# Patient Record
Sex: MALE | Race: WHITE | Employment: FULL TIME | URBAN - METROPOLITAN AREA
[De-identification: names, ages, dates, MRNs, and addresses within clinical notes are randomized per-mention and may not be internally consistent; named-entity substitution may affect disease eponyms.]

---

## 2020-09-03 ENCOUNTER — APPOINTMENT (OUTPATIENT)
Dept: GENERAL RADIOLOGY | Age: 45
End: 2020-09-03
Payer: COMMERCIAL

## 2020-09-03 ENCOUNTER — HOSPITAL ENCOUNTER (EMERGENCY)
Age: 45
Discharge: HOME | End: 2020-09-03
Attending: EMERGENCY MEDICINE
Payer: COMMERCIAL

## 2020-09-03 VITALS
TEMPERATURE: 98.2 F | HEART RATE: 88 BPM | WEIGHT: 249.78 LBS | HEIGHT: 72 IN | BODY MASS INDEX: 33.83 KG/M2 | DIASTOLIC BLOOD PRESSURE: 84 MMHG | SYSTOLIC BLOOD PRESSURE: 118 MMHG | OXYGEN SATURATION: 99 % | RESPIRATION RATE: 16 BRPM

## 2020-09-03 PROCEDURE — 73562 X-RAY EXAM OF KNEE 3: CPT

## 2020-09-03 PROCEDURE — 99283 EMERGENCY DEPT VISIT LOW MDM: CPT

## 2020-09-03 RX ORDER — NAPROXEN 500 MG/1
500 TABLET ORAL 2 TIMES DAILY PRN
Qty: 20 TABLET | Refills: 0 | Status: SHIPPED | OUTPATIENT
Start: 2020-09-03 | End: 2020-12-16 | Stop reason: ALTCHOICE

## 2020-09-03 RX ORDER — LORATADINE 10 MG/1
10 CAPSULE, LIQUID FILLED ORAL DAILY
COMMUNITY
End: 2020-12-16 | Stop reason: ALTCHOICE

## 2020-09-03 ASSESSMENT — PAIN DESCRIPTION - DESCRIPTORS: DESCRIPTORS: ACHING

## 2020-09-03 ASSESSMENT — PAIN SCALES - GENERAL
PAINLEVEL_OUTOF10: 2
PAINLEVEL_OUTOF10: 3

## 2020-09-03 ASSESSMENT — PAIN DESCRIPTION - PAIN TYPE: TYPE: ACUTE PAIN

## 2020-09-03 ASSESSMENT — PAIN DESCRIPTION - ORIENTATION: ORIENTATION: RIGHT

## 2020-09-03 ASSESSMENT — PAIN DESCRIPTION - FREQUENCY: FREQUENCY: INTERMITTENT

## 2020-09-03 ASSESSMENT — PAIN DESCRIPTION - LOCATION: LOCATION: KNEE

## 2020-09-03 NOTE — ED PROVIDER NOTES
[facility] Emergency Department      Pt Name: Lauro Sauceda  MRN: [id number]  [place] 1975  Date of evaluation: 9/3/2020  Provider: Van Jacobson MD  CHIEF COMPLAINT  Chief Complaint   Patient presents with    Knee Pain     Right knee. slipped from a ladder at work about 1 month and half ago 7/16/2020. Increased amount of pain with certain movements such as bending and excessive walking. HPI  Lauro Sauceda is a 53 y.o. male who presents because of right knee pain. He says that he injured it on July 16 at work. He was coming down a ladder and there was a wrong at the very bottom that was slick from oil. He slipped and although it was a short drop, his leg kind of splayed out laterally when he hit the ground. He did not fall directly on it. He has some soreness to it initially and he informed his boss. He thought it would get better on its own and it actually did improve the next week that he had off from work. However, he continues to get flareups of pain ever since then. It hurts worse when he bends and kneels. Pain is to the medial part of his knee. He has not experienced any locking or sensation that is giving away. REVIEW OF SYSTEMS:  No other injury, swelling absent, no numbness Pertinent positives and negatives as per the HPI. All other review of systems reviewed and negative. Nursing notes reviewed.     PAST MEDICAL HISTORY  Past Medical History:   Diagnosis Date    Anesthesia     morphine gtt did not control pain after hip surgery    GERD (gastroesophageal reflux disease)     Headache(784.0)     PONV (postoperative nausea and vomiting)     Thoracic outlet syndrome      SURGICAL HISTORY  Past Surgical History:   Procedure Laterality Date    BONE RESECTION, RIB  3/30/2015    RIGHT FIRST RIB RESECTION                 JOINT REPLACEMENT Left 2007    HIP    ROTATOR CUFF REPAIR Right     SHOULDER SURGERY Right      MEDICATIONS:  No current facility-administered medications on file prior to encounter. Current Outpatient Medications on File Prior to Encounter   Medication Sig Dispense Refill    Phentermine HCl (ADIPEX-P PO) Take 37 mg by mouth daily      loratadine (CLARITIN) 10 MG capsule Take 10 mg by mouth daily      Multiple Vitamins-Minerals (THERAPEUTIC MULTIVITAMIN-MINERALS) tablet Take 1 tablet by mouth daily. ALLERGIES  Patient has no known allergies. SOCIAL HISTORY:  Social History     Tobacco Use    Smoking status: Never Smoker    Smokeless tobacco: Current User     Types: Chew    Tobacco comment: skoal 1 can daily since age 20yrs   Substance Use Topics    Alcohol use: Yes     Alcohol/week: 0.0 standard drinks     Comment: OCCASIONAL    Drug use: No     IMMUNIZATIONS:  Noncontributory    PHYSICAL EXAM  VITAL SIGNS:  Blood pressure 118/84, pulse 88, temperature 98.2 °F (36.8 °C), temperature source Oral, resp. rate 16, height 6' (1.829 m), weight 249 lb 12.5 oz (113.3 kg), SpO2 99 %. Constitutional:  53 y.o. male who does not appear toxic  HENT:  Atraumatic, mucous membranes moist  Eyes:   Conjunctiva clear, no icterus  Neck:  Supple, no signs of injury  Thorax & Lungs:  Respiratory effort normal  Abdomen:  Non distended  Back:  No deformity  Extremity:  Skin appears normal, no obvious effusion, no LL, strong pedal pulse, intact sensation, ankle is nontender, tenderness is mild and medial aspect, gait is normal  Skin:  Warm, dry    DIAGNOSTIC RESULTS:    RADIOLOGY:    Plain x-rays were viewed by me:   XR KNEE RIGHT (3 VIEWS)   Final Result   Trace effusion without acute osseous abnormality. ED COURSE:      PROCEDURES:  None    CONSULTATIONS:  None    MEDICAL DECISION MAKING: Vik Alamo is a 53 y.o. male who presented because of a painful knee. We talked about possible soft tissue sources for pain that do not show up on x ray (cartilage, ligament, tendon, nerve, etc). The patient may need an MRI in the future if continued symptoms.   Mustapha Castillo

## 2020-09-03 NOTE — ED TRIAGE NOTES
Patient came to ER with complaints of increased right knee pain after missing a rung on a ladder at work 7/16/2020. Patient stated increased amount of pain with excessive walking and bending of knee.

## 2020-10-05 ENCOUNTER — HOSPITAL ENCOUNTER (OUTPATIENT)
Dept: MRI IMAGING | Age: 45
Discharge: HOME | End: 2020-10-05
Payer: COMMERCIAL

## 2020-10-05 PROCEDURE — 73721 MRI JNT OF LWR EXTRE W/O DYE: CPT

## 2020-10-05 NOTE — RESULT ENCOUNTER NOTE
[facility] patient, coming in today, 10/5, to discuss MRI. Placed ortho c/s this morning.

## 2020-10-08 ENCOUNTER — OFFICE VISIT (OUTPATIENT)
Dept: ORTHOPEDIC SURGERY | Age: 45
End: 2020-10-08
Payer: COMMERCIAL

## 2020-10-08 VITALS — BODY MASS INDEX: 32.07 KG/M2 | WEIGHT: 242 LBS | HEIGHT: 73 IN | TEMPERATURE: 99.3 F

## 2020-10-08 PROCEDURE — 99243 OFF/OP CNSLTJ NEW/EST LOW 30: CPT | Performed by: ORTHOPAEDIC SURGERY

## 2020-10-08 NOTE — LETTER
[facility]  [address]  Phone: [phone]  Fax: [phone]    Manda Lopez MD        October 8, 2020     Patient: Kyle Ramirez   YOB: 1975   Date of Visit: 10/8/2020       To Whom It May Concern: It is my medical opinion that Kyle Ramirez may return to light duty immediately with the following restrictions: No climbing ladders, pivoting, sqatting, bending, pushing. This will be updated after surgery. If you have any questions or concerns, please don't hesitate to call.     Sincerely,        Manda Lopez MD

## 2020-10-08 NOTE — LETTER
[facility]  [address]  Phone: [phone]  Fax: [phone]    Andi Domínguez MD        October 8, 2020     Patient: Antwan Garcia   YOB: 1975   Date of Visit: 10/8/2020       To Whom It May Concern: It is my medical opinion that Antwan Garcia may return to light duty immediately with the following restrictions: No pivoting, sqatting, bending, pushing. This will be updated after surgery. If you have any questions or concerns, please don't hesitate to call.     Sincerely,        Andi Domínguez MD

## 2020-10-08 NOTE — LETTER
Surgery Scheduling Form:    Patient Name:  Roberto Hoyt  Patient :  1975     Patient MR#:  [id number]    Patient Address:  [address] [place] [id number]    Location:  [facility]  Surgeon:  Milton Kumar M.D.    PCP:  Brittany Contreras MD  Payor/Plan Subscr  Sex Relation Sub. Ins. ID Effective Group Num   1. MATRIX CLAIMSLavelle Goodpasture 1975 Male Self [id number]                                    [address]     Diagnosis:  Right knee medial meniscus tear S83.241A     Operation:  Right knee arthroscopy, partial medial menisectomy and indicated procedures. [id number]    Surgeon's Scheduling Instruction:  elective  Requested Date:     OR Time:       OR Time Required:  50 Minutes  Anesthesia:  General  Surgical Assistant required:  No   Equipment:  none    Standard C-Arm:  No   Mini C-Arm:  No    Status:  Outpatient  PAT Required:  Yes  Comments:       Milton Kumar MD      10/8/20    BILLING INFORMATION:                                                                                               . Date of procedure:  Procedure:       CPT Code Modifier                  Pre-Certification:                                                  Pre-Admission Testing Order Set   In accordance with our formulary system, a generic equivalent drug may be dispensed and administered unless a D.A. W. is written with the medication order  All orders without checkboxes will processed automatically unless crossed out. Orders with checkboxes MUST be checked in order to be carried out. Roberto Hoyt                                                        1975  Date of Procedure/Surgery: Right knee arthroscopy, partial medial menisectomy and indicated procedures. 1. H & P for ALL procedure/surgery completed within 30 days, to be updated day of procedure/surgery  2. [ ]Consults: PT/OT evaluation  3. [X ]Defer to Anesthesia for Pre-Admission testing orders  4.  Diagnostic Tests:

## 2020-10-08 NOTE — PROGRESS NOTES
CHIEF COMPLAINT: Right knee pain. DATE OF INJURY: 7/16/20    History:   Roberto Hoyt is a 53 y.o. male referred by [facility] [place] for evaluation and treatment of right knee pain / injury. The patient complains of right knee pain. This is evaluated as a workers compensation injury. There was a history of injury. He slipped from a ladder at work and planted right leg as he came down and twisted. The pain began almost 3 months ago. The pain is located medial. He describes the symptoms as sharp. He rates pain at 9/10. Symptoms improve with ice, NSAIDs. The symptoms are worse with squatting, kneeling, bending, twisting. The knee has not given out or felt unstable. The patient can bend and straighten the knee fully. There is swelling in the knee. There was catching / locking of the knee. The patient has not had PT. The patient has not had an injection. The patient has taken NSAIDs. The patient has tried ice. The patient's occupation is . Outside reports reviewed: ER note 9/3/20.     Past Medical History:   Diagnosis Date    Anesthesia     morphine gtt did not control pain after hip surgery    GERD (gastroesophageal reflux disease)     Headache(784.0)     PONV (postoperative nausea and vomiting)     Thoracic outlet syndrome        Past Surgical History:   Procedure Laterality Date    BONE RESECTION, RIB  3/30/2015    RIGHT FIRST RIB RESECTION                 JOINT REPLACEMENT Left 2007    HIP    ROTATOR CUFF REPAIR Right     SHOULDER SURGERY Right        Family History   Problem Relation Age of Onset    Other Mother         gerd    Diabetes Father     Heart Disease Father     Other Father         obese    Diabetes Sister         type 2       Social History     Socioeconomic History    Marital status:      Spouse name: Not on file    Number of children: Not on file    Years of education: Not on file    Highest education level: Not on file   Occupational History    Not on file Social Needs    Financial resource strain: Not on file    Food insecurity     Worry: Not on file     Inability: Not on file    Transportation needs     Medical: Not on file     Non-medical: Not on file   Tobacco Use    Smoking status: Never Smoker    Smokeless tobacco: Current User     Types: Chew    Tobacco comment: skoal 1 can daily since age 20yrs   Substance and Sexual Activity    Alcohol use: Yes     Alcohol/week: 0.0 standard drinks     Comment: OCCASIONAL    Drug use: No    Sexual activity: Not on file   Lifestyle    Physical activity     Days per week: Not on file     Minutes per session: Not on file    Stress: Not on file   Relationships    Social connections     Talks on phone: Not on file     Gets together: Not on file     Attends [employer] service: Not on file     Active member of club or organization: Not on file     Attends meetings of clubs or organizations: Not on file     Relationship status: Not on file    Intimate partner violence     Fear of current or ex partner: Not on file     Emotionally abused: Not on file     Physically abused: Not on file     Forced sexual activity: Not on file   Other Topics Concern    Not on file   Social History Narrative    Not on file       Current Outpatient Medications   Medication Sig Dispense Refill    Phentermine HCl (ADIPEX-P PO) Take 37 mg by mouth daily      loratadine (CLARITIN) 10 MG capsule Take 10 mg by mouth daily      naproxen (NAPROSYN) 500 MG tablet Take 1 tablet by mouth 2 times daily as needed for Pain 20 tablet 0    Multiple Vitamins-Minerals (THERAPEUTIC MULTIVITAMIN-MINERALS) tablet Take 1 tablet by mouth daily. No current facility-administered medications for this visit. No Known Allergies    Review of Systems:  Pertinent items are noted in HPI. 13 point review of systems from Patient History Form dated 10/8/20 and available in the patient's chart under the Media tab.       Physical Examination:     Vital signs: Temp 99.3 °F (37.4 °C) (Infrared)   Ht 6' 1\" (1.854 m)   Wt 242 lb (109.8 kg)   BMI 31.93 kg/m²     General:  alert, appears stated age, cooperative and no distress   Gait:  Normal. The patient can bear weight on the injured extremity. Right Knee  Alignment:  neutral   ROM:  0 degrees extension to 120 degrees flexion   Bilateral knees   Crepitus:  no   Joint Tenderness:  medial joint line   Effusion:   20 cc   Patellar excursion:  2 of 4 quadrants    Patellar tilt test:  negative   Patellar facet tenderness:  negative medial   negative lateral   Patellar apprehension test:  negative   Lachman test:  negative   Left knee: negative   Anterior drawer test:  negative   Left knee: negative   Posterior drawer:   negative    Left knee: negative   Varus laxity at 30 degrees:  negative   Left knee: negative   Valgus laxity at 30 degrees:   negative   Left knee: negative   Marlo's test:  positive   Left knee: negative   Risa's test:  positive   Left knee: negative     There is not any cellulitis, lymphedema or cutaneous lesions noted in the lower extremities. Motor exam of the lower extremities show quadriceps, hamstrings, foot dorsiflexion and plantarflexion grossly intact. Sensation to both feet is grossly intact to light touch. The bilateral lower extremities are warm and well-perfused with brisk capillary refill. Imaging:  Right Knee X-Ray: Bilateral sunrise and standing PA xrays of the knee were obtained and reviewed. Lateral view from outside reviewed. No fracture, dislocation, lytic lesion. Normal joint space on right knee, slight medial narrowing on left     Right Knee MRI: I independently reviewed the images, as well as the radiology report. Meniscal degeneration and subtle complex tear involving the posterior horn of    the medial meniscus.  No parameniscal cyst formation.         Multifocal partial thickness chondromalacia within the weight-bearing portion    of the medial compartment.      No lateral meniscal tear.  No cruciate or collateral ligamentous tear.  No    significant joint effusion. Assessment:     Right knee medial meniscus tear  GERD      Plan:     Natural history and expected course discussed. Questions answered. I had an extensive discussion with Mr. Zaid Carter and/or family regarding the natural history, etiology, and long term consequences of his condition. I have outlined a treatment plan with them and, in my opinion, surgical intervention is indicated at this time. I have discussed the potential complications (including, but not limited to injury to nerve or blood vessel, infection, bleeding, DVT or PE, stiffness, incomplete pain relief, need for further surgery, and anesthetic complications), limitations, expectations, alternatives, and risks of the surgical procedure. We also discussed the importance of postoperative rehabilitation. He has had full opportunity to ask his questions. I have answered them all to his satisfaction. I feel that Mr. Zaid Carter understands our discussion today and he will provide written informed consent for the procedure. Plan for right knee arthroscopy, partial medial menisectomy. The patient will see their PCP for H&P and clearance for surgery.

## 2020-10-15 ENCOUNTER — TELEPHONE (OUTPATIENT)
Dept: ORTHOPEDIC SURGERY | Age: 45
End: 2020-10-15

## 2020-10-15 NOTE — TELEPHONE ENCOUNTER
Received a call from [address] [place] [address]. He needs a statement verification letter from Dr Mounika Washburn for his . It needs to say he has a meniscus tear and he is on restrictions and he needs to have surgery. Let me know when completed so I can forward on.

## 2020-10-19 ENCOUNTER — TELEPHONE (OUTPATIENT)
Dept: ORTHOPEDIC SURGERY | Age: 45
End: 2020-10-19

## 2020-10-22 ENCOUNTER — TELEPHONE (OUTPATIENT)
Dept: ORTHOPEDIC SURGERY | Age: 45
End: 2020-10-22

## 2020-10-22 RX ORDER — TRAMADOL HYDROCHLORIDE 50 MG/1
50 TABLET ORAL DAILY PRN
Qty: 7 TABLET | Refills: 0 | Status: SHIPPED | OUTPATIENT
Start: 2020-10-22 | End: 2020-10-29

## 2020-10-22 NOTE — TELEPHONE ENCOUNTER
Script for Ultram escribed. He needs to use NSAIDs and Tylenol as needed for pain. Ice as needed for pain.

## 2020-11-19 ENCOUNTER — TELEPHONE (OUTPATIENT)
Dept: ORTHOPEDIC SURGERY | Age: 45
End: 2020-11-19

## 2020-11-19 RX ORDER — TRAMADOL HYDROCHLORIDE 50 MG/1
50 TABLET ORAL DAILY PRN
Qty: 18 TABLET | Refills: 0 | Status: SHIPPED | OUTPATIENT
Start: 2020-11-19 | End: 2020-11-26

## 2020-11-19 NOTE — TELEPHONE ENCOUNTER
Goldy Cuellar   11/19/20 1:35 PM   Note      Prescription Refill      Medication Name:  TRAMADOL  [facility]   Patient Contact Number:  [phone]     PATIENT REQUESTING A REFILL OF HIS MEDICATION.

## 2020-11-19 NOTE — TELEPHONE ENCOUNTER
Script for Ultram escribed. Can we check on patient's C9 request for surgery?  I don't see surgery scheduled yet or anything in media about approval.

## 2020-11-19 NOTE — TELEPHONE ENCOUNTER
Prescription Refill     Medication Name:  TRAMADOL  Pharmacy: [facility]   Patient Contact Number:  [phone]    PATIENT REQUESTING A REFILL OF HIS MEDICATION.

## 2020-11-24 ENCOUNTER — TELEPHONE (OUTPATIENT)
Dept: ORTHOPEDIC SURGERY | Age: 45
End: 2020-11-24

## 2020-11-24 NOTE — TELEPHONE ENCOUNTER
FYI  Right knee scope was requested, received following response from [facility] department:    \"PER  IRINEO NANCE THIS WAS DENIED PER LUCILA WHICH STATES THE CONDITION IS DEGENERATIVE IN NATURE-THE LETTER WAS MAILED TODAY  LETTING THE IW KNOW HE HAS 14 DAYS TO APPEAL.  \"

## 2020-11-25 ENCOUNTER — TELEPHONE (OUTPATIENT)
Dept: ORTHOPEDIC SURGERY | Age: 45
End: 2020-11-25

## 2020-11-25 NOTE — TELEPHONE ENCOUNTER
I spoke with patient he said he was aware that [address] [place] [address]  Denied his surgery because [address] [place] [address]  Called hi, He said he was going to appeal that because it happen at work. The [address] [place] [address]  Letter states that this was a degenerative tear and not caused bu the injury. I told the patient that we will wait until [address] the surgery. He understood.

## 2020-12-08 ENCOUNTER — TELEPHONE (OUTPATIENT)
Dept: ORTHOPEDIC SURGERY | Age: 45
End: 2020-12-08

## 2020-12-08 RX ORDER — TRAMADOL HYDROCHLORIDE 50 MG/1
50 TABLET ORAL 2 TIMES DAILY PRN
Qty: 14 TABLET | Refills: 0 | Status: SHIPPED | OUTPATIENT
Start: 2020-12-08 | End: 2020-12-15

## 2020-12-08 NOTE — TELEPHONE ENCOUNTER
Spoke with patient. Relayed all information from Dr. Gus Wilkinson. Patient states that he is appealing his [facility] case, but will not hire an . He states that the bureau told him it could take 6 months to go through appeal process. He said that he needs to have surgery, but will not pay for it or go through insurance just because his employer doesn't want to pay for it. He asked if Dr. Gus Wilkinson would continue to RX medication since he is appealing his case. Advised that Dr. Gus Wilkinson does not prescribe chronic medication and I cannot see him prescribing it for 6 months. Patient then stated that he would just have to find another doctor.

## 2020-12-08 NOTE — TELEPHONE ENCOUNTER
Script for Ultram escribed. His claim for surgery was denied by [address] [place] [address]. He needs to decide whether he wants to hire  and appeal, or proceed with insurance. Will not continue to refill Ultram forever.

## 2020-12-08 NOTE — TELEPHONE ENCOUNTER
Surgery and/or Procedure Scheduling     Contact Name: Aden Lemos Request: Wanting to schedule surgery.    Patient Contact Number: [phone]

## 2020-12-08 NOTE — TELEPHONE ENCOUNTER
Prescription Refill     Medication Name:  [address] [place] [address] [address]: [address]  Patient Contact Number: [phone].

## 2020-12-08 NOTE — TELEPHONE ENCOUNTER
Patient would like to proceed using insurance.      [employer]  ID: [id number]  Group # [id number]    [address] [address] [address] [phone]

## 2020-12-10 ENCOUNTER — TELEPHONE (OUTPATIENT)
Dept: ORTHOPEDIC SURGERY | Age: 45
End: 2020-12-10

## 2020-12-10 NOTE — TELEPHONE ENCOUNTER
CPT: 42473  BODY PART: right knee  AUTHORIZATION: approved    Submitted via fax to SAHIL Jaramillo 19 12/10/20    Approved # [id number]

## 2020-12-15 PROCEDURE — MISCCOLD COLD THERAPY UNIT AND PAD: Performed by: ORTHOPAEDIC SURGERY

## 2020-12-17 ENCOUNTER — OFFICE VISIT (OUTPATIENT)
Dept: PRIMARY CARE CLINIC | Age: 45
End: 2020-12-17
Payer: COMMERCIAL

## 2020-12-17 PROCEDURE — 99211 OFF/OP EST MAY X REQ PHY/QHP: CPT | Performed by: NURSE PRACTITIONER

## 2020-12-17 NOTE — PROGRESS NOTES
Kim Oquendo received a viral test for COVID-19. They were educated on isolation and quarantine as appropriate. For any symptoms, they were directed to seek care from their PCP, given contact information to establish with a doctor, directed to an urgent care or the emergency room.

## 2020-12-17 NOTE — PATIENT INSTRUCTIONS
You have received a viral test for COVID-19. Below is education on quarantine per the CDC guidelines. For any symptoms, seek care from your PCP, call [phone] to establish care with a doctor, or go directly to an urgent care or the emergency room. Test results will take 2-7 days and will be sent to you in your [email or web address] account. If you test positive, you will be contacted via phone. If you test negative, the ONLY communication will be through [address] GO TO [employer] AND SIGN UP FOR [email or web address]  (LOWER LEFT OF THE HOME PAGE)  No test is 100%. If you have symptoms, you should follow the guidance of quarantine as previously stated. You can still be contagious if you have symptoms. Your [facility] will reach out to you if you have a positive result. They will provide you with a return to work date and note. If you were tested for a pre-op, then you should remain in quarantine until your procedure. How do I know if I need to be in quarantine? If you live in a community where COVID-19 is or might be spreading (currently, that is virtually everywhere in the [place])  Be alert for symptoms. Watch for fever, cough, shortness of breath, or other symptoms of COVID-19.  ? Take your temperature if symptoms develop. ? Practice social distancing. Maintain 6 feet of distance from others and stay out of crowded places. ? Follow CDC guidance if symptoms develop. If you feel healthy but:  ? Recently had close contact with a person with COVID-19 you need to Quarantine:  ? Stay home until 14 days after your last exposure. ? Check your temperature twice a day and watch for symptoms of COVID-19.  ? If possible, stay away from people who are at higher-risk for getting very sick from COVID-19. Stay Home and Monitor Your Health if you:  ? Have been diagnosed with COVID-19, or  ? Are waiting for test results, or  ?  Have cough, fever, or shortness of breath, or symptoms of COVID-19 When You Can be Around Others After You Had or Likely Had COVID-19     If you have or think you might have COVID-19, it is important to stay home and away from other people. Staying away from others helps stop the spread of COVID-19. If you have an emergency warning sign (including trouble breathing), get emergency medical care immediately. When you can be around others (end home isolation) depends on different factors for different situations. Find CDC's recommendations for your situation below. I think or know I had COVID-19, and I had symptoms  You can be with others after  ? 3 days with no fever and  ? Respiratory symptoms have improved (e.g. cough, shortness of breath) and  ? 10 days since symptoms first appeared  Depending on your healthcare provider's advice and availability of testing, you might get tested to see if you still have COVID-19. If you will be tested, you can be around others when you have no fever, respiratory symptoms have improved, and you receive two negative test results in a row, at least 24 hours apart. I tested positive for COVID-19 but had no symptoms  If you continue to have no symptoms, you can be with others after:  ? 10 days have passed since test or 14 days since your exposure test   Depending on your healthcare provider's advice and availability of testing, you might get tested to see if you still have COVID-19. If you will be tested, you can be around others after you receive two negative test results in a row, at least 24 hours apart. If you develop symptoms after testing positive, follow the guidance above for I think or know I had COVID, and I had symptoms.   For Anyone Who Has Been Around a Person with COVID-19  It is important to remember that anyone who has close contact with someone with COVID-19 should stay home for 14 days after exposure based on the time it takes to develop illness. Testing is not necessary.     www.cdc.gov/coronavirus/2019-ncov/index.html

## 2020-12-18 LAB — SARS-COV-2, NAA: NOT DETECTED

## 2020-12-21 ENCOUNTER — TELEPHONE (OUTPATIENT)
Dept: ORTHOPEDIC SURGERY | Age: 45
End: 2020-12-21

## 2020-12-21 RX ORDER — ACETAMINOPHEN 325 MG/1
650 TABLET ORAL EVERY 6 HOURS PRN
Status: ON HOLD | COMMUNITY
End: 2020-12-23 | Stop reason: HOSPADM

## 2020-12-21 NOTE — TELEPHONE ENCOUNTER
Other Patient is calling and would like a call back. Patient is wanting to make sure the office received his short term disability paperwork.  Patient would like a call back to alice that paperwork has been filled out and faxed back to [address].  [phone]

## 2020-12-21 NOTE — TELEPHONE ENCOUNTER
I spoke with patient and told him that we have not received the forms as of today. He said that this was supposed to be done a week ago. I gave him our fax number and told that to call them. I made him aware that due to the holidays coming up this may not be done this week. He understood.   He is having surgery on 12/23/20

## 2020-12-21 NOTE — PROGRESS NOTES
Preoperative Screening for Elective Surgery/Invasive Procedures While COVID-19 present in the community     Have you tested positive or have been told to self-isolate for COVID-19 like symptoms within the past 28 days? n   Do you currently have any of the following symptoms? n  o Fever >100.0 F or 99.9 F in immunocompromised patients?n  o New onset cough, shortness of breath or difficulty breathing?n  o New onset sore throat, myalgia (muscle aches and pains), headache, loss of taste/smell or diarrhea?n   Have you had a potential exposure to COVID-19 within the past 14 days by:  o Close contact with a confirmed case?n  o Close contact with a healthcare worker,  or essential infrastructure worker (grocery store, [employer], gas station, public utilities or transportation)? YES  o Do you reside in a congregate setting such as; skilled nursing facility, adult home, correctional facility, homeless shelter or other institutional setting? no  o Have you had recent travel to a known COVID-19 hotspot? Pt resides in [address] if the patient has a positive screen by answering yes to one or more of the above questions. Patients who test positive or screen positive prior to surgery or on the day of surgery should be evaluated in conjunction with the surgeon/proceduralist/anesthesiologist to determine the urgency of the procedure.

## 2020-12-21 NOTE — PROGRESS NOTES
C-Difficile admission screening and protocol:     * Admitted with diarrhea? n     *Prior history of C-Diff. In last 3 months?n     *Antibiotic use in the past 6-8 weeks?n     *Prior hospitalization or nursing home in the last month?  n    C-Difficile admission screening and protocol:     * Admitted with diarrhea? *Prior history of C-Diff. In last 3 months? *Antibiotic use in the past 6-8 weeks? *Prior hospitalization or nursing home in the last month? [address]  time__730______        Surgery time____________    Take the following medications with a sip of water:    Do not eat or drink anything after 12:00 midnight prior to your surgery. This includes water chewing gum, mints and ice chips. You may brush your teeth and gargle the morning of your surgery, but do not swallow the water     Please see your family doctor/pediatrician for a history and physical and/or concerning medications. Bring any test results/reports from your physicians office. If you are under the care of a heart doctor or specialist doctor, please be aware that you may be asked to them for clearance    You may be asked to stop blood thinners such as Coumadin, Plavix, Fragmin, Lovenox, etc., or any anti-inflammatories such as:  Aspirin, Ibuprofen, Advil, Naproxen prior to your surgery. We also ask that you stop any OTC medications such as fish oil, vitamin E, glucosamine, garlic, Multivitamins, COQ 10, etc.    We ask that you do not smoke 24 hours prior to surgery  We ask that you do not  drink any alcoholic beverages 24 hours prior to surgery     You must make arrangements for a responsible adult to take you home after your surgery. For your safety you will not be allowed to leave alone or drive yourself home. Your surgery will be cancelled if you do not have a ride home.      Also for your safety, it is strongly suggested that someone stay with you the first 24 hours focus on providing this care for you. Please contact pre-admission testing if you have any further questions. [facility] phone number:  [phone] PAT fax number:  [phone]  Please note these are generalized instructions for all surgical cases, you may be provided with more specific instructions according to your surgery.

## 2020-12-21 NOTE — TELEPHONE ENCOUNTER
FAXED COMPLETED APS FORM TO [facility] @ [phone]     HAD A BLANK FORM IN MY FILES.   PATIENT CALLED AND I PULLED MY BLANK FORM AND COMPLETED

## 2020-12-22 ENCOUNTER — ANESTHESIA EVENT (OUTPATIENT)
Dept: OPERATING ROOM | Age: 45
End: 2020-12-22
Payer: COMMERCIAL

## 2020-12-23 ENCOUNTER — ANESTHESIA (OUTPATIENT)
Dept: OPERATING ROOM | Age: 45
End: 2020-12-23
Payer: COMMERCIAL

## 2020-12-23 ENCOUNTER — HOSPITAL ENCOUNTER (OUTPATIENT)
Age: 45
Setting detail: OUTPATIENT SURGERY
Discharge: HOME | End: 2020-12-23
Attending: ORTHOPAEDIC SURGERY | Admitting: ORTHOPAEDIC SURGERY
Payer: COMMERCIAL

## 2020-12-23 VITALS
SYSTOLIC BLOOD PRESSURE: 154 MMHG | RESPIRATION RATE: 5 BRPM | DIASTOLIC BLOOD PRESSURE: 72 MMHG | OXYGEN SATURATION: 96 %

## 2020-12-23 VITALS
HEIGHT: 72 IN | OXYGEN SATURATION: 98 % | WEIGHT: 251.1 LBS | TEMPERATURE: 97 F | RESPIRATION RATE: 14 BRPM | HEART RATE: 68 BPM | BODY MASS INDEX: 34.01 KG/M2 | DIASTOLIC BLOOD PRESSURE: 70 MMHG | SYSTOLIC BLOOD PRESSURE: 120 MMHG

## 2020-12-23 PROCEDURE — 6360000002 HC RX W HCPCS: Performed by: ORTHOPAEDIC SURGERY

## 2020-12-23 PROCEDURE — 2709999900 HC NON-CHARGEABLE SUPPLY: Performed by: ORTHOPAEDIC SURGERY

## 2020-12-23 PROCEDURE — 6370000000 HC RX 637 (ALT 250 FOR IP): Performed by: ANESTHESIOLOGY

## 2020-12-23 PROCEDURE — 3600000014 HC SURGERY LEVEL 4 ADDTL 15MIN: Performed by: ORTHOPAEDIC SURGERY

## 2020-12-23 PROCEDURE — 7100000000 HC PACU RECOVERY - FIRST 15 MIN: Performed by: ORTHOPAEDIC SURGERY

## 2020-12-23 PROCEDURE — 2580000003 HC RX 258: Performed by: ANESTHESIOLOGY

## 2020-12-23 PROCEDURE — 7100000010 HC PHASE II RECOVERY - FIRST 15 MIN: Performed by: ORTHOPAEDIC SURGERY

## 2020-12-23 PROCEDURE — 3700000001 HC ADD 15 MINUTES (ANESTHESIA): Performed by: ORTHOPAEDIC SURGERY

## 2020-12-23 PROCEDURE — 3700000000 HC ANESTHESIA ATTENDED CARE: Performed by: ORTHOPAEDIC SURGERY

## 2020-12-23 PROCEDURE — 7100000001 HC PACU RECOVERY - ADDTL 15 MIN: Performed by: ORTHOPAEDIC SURGERY

## 2020-12-23 PROCEDURE — 2580000003 HC RX 258: Performed by: ORTHOPAEDIC SURGERY

## 2020-12-23 PROCEDURE — 7100000011 HC PHASE II RECOVERY - ADDTL 15 MIN: Performed by: ORTHOPAEDIC SURGERY

## 2020-12-23 PROCEDURE — 6360000002 HC RX W HCPCS: Performed by: ANESTHESIOLOGY

## 2020-12-23 PROCEDURE — 6360000002 HC RX W HCPCS: Performed by: NURSE ANESTHETIST, CERTIFIED REGISTERED

## 2020-12-23 PROCEDURE — 3600000004 HC SURGERY LEVEL 4 BASE: Performed by: ORTHOPAEDIC SURGERY

## 2020-12-23 PROCEDURE — 2500000003 HC RX 250 WO HCPCS: Performed by: NURSE ANESTHETIST, CERTIFIED REGISTERED

## 2020-12-23 PROCEDURE — 2500000003 HC RX 250 WO HCPCS: Performed by: ANESTHESIOLOGY

## 2020-12-23 RX ORDER — FENTANYL CITRATE 50 UG/ML
INJECTION, SOLUTION INTRAMUSCULAR; INTRAVENOUS PRN
Status: DISCONTINUED | OUTPATIENT
Start: 2020-12-23 | End: 2020-12-23 | Stop reason: SDUPTHER

## 2020-12-23 RX ORDER — FENTANYL CITRATE 50 UG/ML
25 INJECTION, SOLUTION INTRAMUSCULAR; INTRAVENOUS EVERY 5 MIN PRN
Status: DISCONTINUED | OUTPATIENT
Start: 2020-12-23 | End: 2020-12-23 | Stop reason: HOSPADM

## 2020-12-23 RX ORDER — KETOROLAC TROMETHAMINE 30 MG/ML
INJECTION, SOLUTION INTRAMUSCULAR; INTRAVENOUS PRN
Status: DISCONTINUED | OUTPATIENT
Start: 2020-12-23 | End: 2020-12-23 | Stop reason: SDUPTHER

## 2020-12-23 RX ORDER — MIDAZOLAM HYDROCHLORIDE 1 MG/ML
INJECTION INTRAMUSCULAR; INTRAVENOUS PRN
Status: DISCONTINUED | OUTPATIENT
Start: 2020-12-23 | End: 2020-12-23 | Stop reason: SDUPTHER

## 2020-12-23 RX ORDER — PROPOFOL 10 MG/ML
INJECTION, EMULSION INTRAVENOUS PRN
Status: DISCONTINUED | OUTPATIENT
Start: 2020-12-23 | End: 2020-12-23 | Stop reason: SDUPTHER

## 2020-12-23 RX ORDER — APREPITANT 40 MG/1
40 CAPSULE ORAL ONCE
Status: COMPLETED | OUTPATIENT
Start: 2020-12-23 | End: 2020-12-23

## 2020-12-23 RX ORDER — OXYCODONE HYDROCHLORIDE AND ACETAMINOPHEN 5; 325 MG/1; MG/1
2 TABLET ORAL PRN
Status: COMPLETED | OUTPATIENT
Start: 2020-12-23 | End: 2020-12-23

## 2020-12-23 RX ORDER — SODIUM CHLORIDE 0.9 % (FLUSH) 0.9 %
10 SYRINGE (ML) INJECTION EVERY 12 HOURS SCHEDULED
Status: DISCONTINUED | OUTPATIENT
Start: 2020-12-23 | End: 2020-12-23 | Stop reason: HOSPADM

## 2020-12-23 RX ORDER — MAGNESIUM HYDROXIDE 1200 MG/15ML
LIQUID ORAL CONTINUOUS PRN
Status: COMPLETED | OUTPATIENT
Start: 2020-12-23 | End: 2020-12-23

## 2020-12-23 RX ORDER — ONDANSETRON 2 MG/ML
4 INJECTION INTRAMUSCULAR; INTRAVENOUS
Status: DISCONTINUED | OUTPATIENT
Start: 2020-12-23 | End: 2020-12-23 | Stop reason: HOSPADM

## 2020-12-23 RX ORDER — SODIUM CHLORIDE 9 MG/ML
INJECTION, SOLUTION INTRAVENOUS CONTINUOUS
Status: DISCONTINUED | OUTPATIENT
Start: 2020-12-23 | End: 2020-12-23 | Stop reason: HOSPADM

## 2020-12-23 RX ORDER — LIDOCAINE HYDROCHLORIDE 20 MG/ML
INJECTION, SOLUTION EPIDURAL; INFILTRATION; INTRACAUDAL; PERINEURAL PRN
Status: DISCONTINUED | OUTPATIENT
Start: 2020-12-23 | End: 2020-12-23 | Stop reason: SDUPTHER

## 2020-12-23 RX ORDER — POVIDONE-IODINE 10 MG/G
OINTMENT TOPICAL
Status: COMPLETED | OUTPATIENT
Start: 2020-12-23 | End: 2020-12-23

## 2020-12-23 RX ORDER — HYDROCODONE BITARTRATE AND ACETAMINOPHEN 5; 325 MG/1; MG/1
1 TABLET ORAL EVERY 4 HOURS PRN
Qty: 40 TABLET | Refills: 0 | Status: SHIPPED | OUTPATIENT
Start: 2020-12-23 | End: 2020-12-30

## 2020-12-23 RX ORDER — OXYCODONE HYDROCHLORIDE AND ACETAMINOPHEN 5; 325 MG/1; MG/1
1 TABLET ORAL PRN
Status: COMPLETED | OUTPATIENT
Start: 2020-12-23 | End: 2020-12-23

## 2020-12-23 RX ORDER — SODIUM CHLORIDE 0.9 % (FLUSH) 0.9 %
10 SYRINGE (ML) INJECTION PRN
Status: DISCONTINUED | OUTPATIENT
Start: 2020-12-23 | End: 2020-12-23 | Stop reason: HOSPADM

## 2020-12-23 RX ORDER — DEXAMETHASONE SODIUM PHOSPHATE 4 MG/ML
INJECTION, SOLUTION INTRA-ARTICULAR; INTRALESIONAL; INTRAMUSCULAR; INTRAVENOUS; SOFT TISSUE PRN
Status: DISCONTINUED | OUTPATIENT
Start: 2020-12-23 | End: 2020-12-23 | Stop reason: SDUPTHER

## 2020-12-23 RX ORDER — ROPIVACAINE HYDROCHLORIDE 5 MG/ML
INJECTION, SOLUTION EPIDURAL; INFILTRATION; PERINEURAL
Status: COMPLETED | OUTPATIENT
Start: 2020-12-23 | End: 2020-12-23

## 2020-12-23 RX ORDER — PROMETHAZINE HYDROCHLORIDE 25 MG/1
25 TABLET ORAL EVERY 6 HOURS PRN
Qty: 5 TABLET | Refills: 0 | Status: SHIPPED | OUTPATIENT
Start: 2020-12-23 | End: 2021-02-08

## 2020-12-23 RX ORDER — ONDANSETRON 2 MG/ML
INJECTION INTRAMUSCULAR; INTRAVENOUS PRN
Status: DISCONTINUED | OUTPATIENT
Start: 2020-12-23 | End: 2020-12-23 | Stop reason: SDUPTHER

## 2020-12-23 RX ADMIN — FENTANYL CITRATE 50 MCG: 50 INJECTION INTRAMUSCULAR; INTRAVENOUS at 09:29

## 2020-12-23 RX ADMIN — SODIUM CHLORIDE: 9 INJECTION, SOLUTION INTRAVENOUS at 07:08

## 2020-12-23 RX ADMIN — LIDOCAINE HYDROCHLORIDE 100 MG: 20 INJECTION, SOLUTION EPIDURAL; INFILTRATION; INTRACAUDAL; PERINEURAL at 09:20

## 2020-12-23 RX ADMIN — FENTANYL CITRATE 50 MCG: 50 INJECTION INTRAMUSCULAR; INTRAVENOUS at 09:31

## 2020-12-23 RX ADMIN — MIDAZOLAM 2 MG: 1 INJECTION INTRAMUSCULAR; INTRAVENOUS at 09:17

## 2020-12-23 RX ADMIN — FAMOTIDINE 20 MG: 10 INJECTION INTRAVENOUS at 07:33

## 2020-12-23 RX ADMIN — APREPITANT 40 MG: 40 CAPSULE ORAL at 07:33

## 2020-12-23 RX ADMIN — CEFAZOLIN SODIUM 2 G: 10 INJECTION, POWDER, FOR SOLUTION INTRAVENOUS at 09:17

## 2020-12-23 RX ADMIN — PROPOFOL 300 MG: 10 INJECTION, EMULSION INTRAVENOUS at 09:20

## 2020-12-23 RX ADMIN — KETOROLAC TROMETHAMINE 30 MG: 30 INJECTION, SOLUTION INTRAMUSCULAR at 09:44

## 2020-12-23 RX ADMIN — OXYCODONE HYDROCHLORIDE AND ACETAMINOPHEN 2 TABLET: 5; 325 TABLET ORAL at 10:26

## 2020-12-23 RX ADMIN — ONDANSETRON 4 MG: 2 INJECTION INTRAMUSCULAR; INTRAVENOUS at 09:20

## 2020-12-23 RX ADMIN — DEXAMETHASONE SODIUM PHOSPHATE 10 MG: 4 INJECTION, SOLUTION INTRAMUSCULAR; INTRAVENOUS at 09:20

## 2020-12-23 ASSESSMENT — PULMONARY FUNCTION TESTS
PIF_VALUE: 4
PIF_VALUE: 14
PIF_VALUE: 6
PIF_VALUE: 8
PIF_VALUE: 13
PIF_VALUE: 20
PIF_VALUE: 8
PIF_VALUE: 8
PIF_VALUE: 6
PIF_VALUE: 8
PIF_VALUE: 3
PIF_VALUE: 8
PIF_VALUE: 21
PIF_VALUE: 13
PIF_VALUE: 9
PIF_VALUE: 8
PIF_VALUE: 8
PIF_VALUE: 6
PIF_VALUE: 23
PIF_VALUE: 8
PIF_VALUE: 0
PIF_VALUE: 8
PIF_VALUE: 13
PIF_VALUE: 2
PIF_VALUE: 8
PIF_VALUE: 0
PIF_VALUE: 2
PIF_VALUE: 8
PIF_VALUE: 0
PIF_VALUE: 4
PIF_VALUE: 0
PIF_VALUE: 8
PIF_VALUE: 2

## 2020-12-23 ASSESSMENT — PAIN DESCRIPTION - ORIENTATION
ORIENTATION: RIGHT

## 2020-12-23 ASSESSMENT — LIFESTYLE VARIABLES: SMOKING_STATUS: 0

## 2020-12-23 ASSESSMENT — PAIN DESCRIPTION - ONSET
ONSET: ON-GOING

## 2020-12-23 ASSESSMENT — PAIN DESCRIPTION - FREQUENCY
FREQUENCY: CONTINUOUS

## 2020-12-23 ASSESSMENT — ENCOUNTER SYMPTOMS: SHORTNESS OF BREATH: 0

## 2020-12-23 ASSESSMENT — PAIN - FUNCTIONAL ASSESSMENT
PAIN_FUNCTIONAL_ASSESSMENT: 0-10
PAIN_FUNCTIONAL_ASSESSMENT: PREVENTS OR INTERFERES WITH ALL ACTIVE AND SOME PASSIVE ACTIVITIES
PAIN_FUNCTIONAL_ASSESSMENT: PREVENTS OR INTERFERES WITH ALL ACTIVE AND SOME PASSIVE ACTIVITIES
PAIN_FUNCTIONAL_ASSESSMENT: PREVENTS OR INTERFERES WITH MANY ACTIVE NOT PASSIVE ACTIVITIES

## 2020-12-23 ASSESSMENT — PAIN DESCRIPTION - PROGRESSION
CLINICAL_PROGRESSION: GRADUALLY WORSENING
CLINICAL_PROGRESSION: NOT CHANGED
CLINICAL_PROGRESSION: NOT CHANGED

## 2020-12-23 ASSESSMENT — PAIN DESCRIPTION - DESCRIPTORS
DESCRIPTORS: BURNING
DESCRIPTORS: ACHING
DESCRIPTORS: ACHING
DESCRIPTORS: BURNING
DESCRIPTORS: ACHING

## 2020-12-23 ASSESSMENT — PAIN SCALES - GENERAL
PAINLEVEL_OUTOF10: 6
PAINLEVEL_OUTOF10: 7
PAINLEVEL_OUTOF10: 7
PAINLEVEL_OUTOF10: 4

## 2020-12-23 ASSESSMENT — PAIN DESCRIPTION - LOCATION
LOCATION: KNEE

## 2020-12-23 ASSESSMENT — PAIN DESCRIPTION - PAIN TYPE
TYPE: SURGICAL PAIN

## 2020-12-23 NOTE — PROGRESS NOTES
CLINICAL PHARMACY NOTE: MEDS TO [facility] Select Patient?: No  Total # of Prescriptions Filled: 2   The following medications were delivered to the patient:  Discharge Medication List as of 12/23/2020 11:10 AM      START taking these medications    Details   HYDROcodone-acetaminophen (NORCO) 5-325 MG per tablet Take 1 tablet by mouth every 4 hours as needed for Pain for up to 7 days. Intended supply: 7 days.  Take lowest dose possible to manage pain, Disp-40 tablet, R-0Print      promethazine (PHENERGAN) 25 MG tablet Take 1 tablet by mouth every 6 hours as needed for Nausea, Disp-5 tablet, R-0Print         ·   ·   Total # of Interventions Completed: 0  Time Spent (min): 30    Additional Documentation:

## 2020-12-23 NOTE — PROGRESS NOTES
Pt arrived to PACU from OR. Pt sleeping on 3l/nc with oral airway in place. Right leg with ace wrap C/D/I. +pulses noted. Ice pack applied.

## 2020-12-23 NOTE — H&P
Preoperative H&P Update    The patient's History and Physical in the medical record from 12/14/20 was reviewed by me today. I reviewed the HPI, medications, allergies, reason for surgery, diagnosis and treatment plan and there has been no interval change. The patient was examined by me today.  Physical exam findings for this update include:    /77   Pulse 74   Temp 97.1 °F (36.2 °C) (Temporal)   Resp 18   Ht 6' (1.829 m)   Wt 251 lb 1.7 oz (113.9 kg)   SpO2 98%   BMI 34.06 kg/m²    Airway is intact  Chest: breathing comfortably  Heart: regular rate  Findings on exam of the body region where surgery is to be performed include: see last office and/or consult note        Electronically signed by Mir Donahue MD on 12/23/2020 at 8:44 AM

## 2020-12-23 NOTE — BRIEF OP NOTE
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

## 2020-12-23 NOTE — ANESTHESIA POSTPROCEDURE EVALUATION
Department of Anesthesiology  Postprocedure Note    Patient: Pete Mireles  MRN: [id number]  [place]: 1975  Date of evaluation: 12/23/2020  Time:  11:27 AM     Procedure Summary     Date: 12/23/20 Room / Location: [address]    Anesthesia Start: 4975 Anesthesia Stop: 2645    Procedure: RIGHT KNEE ARTHROSCOPY, PARTIAL MEDIAL MENISCECTOMY (Right Knee) Diagnosis: (RIGHT KNEE MEDIAL MENISCUS TEAR)    Surgeons: Dana Dean MD Responsible Provider: Kailey Morrow MD    Anesthesia Type: general ASA Status: 2          Anesthesia Type: general    Kate Phase I: Kate Score: 8    Kate Phase II: Kate Score: 10    Last vitals: Reviewed and per EMR flowsheets.        Anesthesia Post Evaluation    Patient location during evaluation: PACU  Patient participation: complete - patient participated  Level of consciousness: awake and alert  Airway patency: patent  Nausea & Vomiting: no nausea and no vomiting  Complications: no  Cardiovascular status: hemodynamically stable  Respiratory status: acceptable  Hydration status: stable

## 2020-12-28 ENCOUNTER — OFFICE VISIT (OUTPATIENT)
Dept: ORTHOPEDIC SURGERY | Age: 45
End: 2020-12-28

## 2020-12-28 VITALS — BODY MASS INDEX: 34 KG/M2 | TEMPERATURE: 98.8 F | WEIGHT: 251 LBS | HEIGHT: 72 IN

## 2020-12-28 PROCEDURE — 99024 POSTOP FOLLOW-UP VISIT: CPT | Performed by: ORTHOPAEDIC SURGERY

## 2020-12-28 NOTE — PROGRESS NOTES
Knee Arthroscopy Follow-up  Zaid Carter is here for follow up after right knee arthroscopic surgery. Surgery date was 12/23/20. Findings at surgery: medial meniscus tear. Pain is controlled with current analgesics. Medication(s) being used: Norco. The patient's pain is rated at 4/10. The patient denies fever, wound drainage, increasing redness, pus, increasing swelling. Post op problems reported: none. He is ambulating with crutches with touch-down weight-bearing as instructed. Physical Examination:  Temp 98.8 °F (37.1 °C)   Ht 6' (1.829 m)   Wt 251 lb (113.9 kg)   BMI 34.04 kg/m²    Patient is awake, alert, and in no acute distress. The incisions are clean, dry, no drainage. There is no warmth, erythema, or purulent drainage over the incisions. Assessment:   5 days status post right knee arthroscopy, partial medial menisectomy      Plan: We reviewed intra-operative arthroscopy photos. Start physical therapy. A physical therapy order was placed and postoperative physical therapy protocol was provided to the patient to give to the physical therapist.    The patient may advance their weight-bearing as tolerated. The patient should use the cold pad or apply ice to the knee continuously for 3 days after surgery. Then use cold pad or ice 20-30 minutes only, 3-5 times per day as needed. Refill pain medications as needed. OTC NSAIDs for 2 weeks. The patient was advised that NSAID-type medications have two very important potential side effects: gastrointestinal irritation including hemorrhage and renal injuries. He was asked to take the medication with food and to stop if he experiences any GI upset. I asked him to call for vomiting, abdominal pain or black/bloody stools. The patient expresses understanding of these issues and questions were answered. No driving while on pain medications if it causes impairment. No driving until able to move leg to use gas / brake. Patient may start showering now. Cover with plastic bag for 3 days. No baths or soaks. Can leave open to air or apply band-aids to the incisions when out of Tegaderm. Return to office at the 2 week postop time period for suture removal and evaluation of progress.

## 2020-12-30 ENCOUNTER — HOSPITAL ENCOUNTER (OUTPATIENT)
Dept: PHYSICAL THERAPY | Age: 45
Setting detail: THERAPIES SERIES
Discharge: HOME | End: 2020-12-30
Payer: COMMERCIAL

## 2020-12-30 PROCEDURE — 97161 PT EVAL LOW COMPLEX 20 MIN: CPT

## 2020-12-30 PROCEDURE — 97110 THERAPEUTIC EXERCISES: CPT

## 2020-12-30 NOTE — PROGRESS NOTES
[facility], [address] [place] [address]  Phone: [phone]   Fax:     [phone]      Physical Therapy Treatment Note/ Progress Report:     Date:  2020    Patient Name:  Mustapha Cedillo    :  1975  MRN: [id number]    Pertinent Medical History:     Medical/Treatment Diagnosis Information:  · Diagnosis: Complex tear of medial meniscus of right knee as current injury, initial encounter (S83.231A); R knee scope, PMM on 20; follow protocol  · Treatment Diagnosis: Decreased functional mobility    Insurance/Certification information:  PT Insurance Information: R  Physician Information:  Referring Practitioner: Dr. Nathan Reynolds of care signed (Y/N): sent on 20    Date of Patient follow up with Physician:      Progress Report: []  Yes  [x]  No     Date Range for reporting period:  Beginnin20  Ending:      Progress report due (10 Rx/or 30 days whichever is less):      Recertification due (POC duration/ or 90 days whichever is less):     Visit # POC/Insurance Allowable Auth Needed   1 12 []Yes   []No     Latex Allergy:  [x]NO      []YES  Preferred Language for Healthcare:   [x]English       []other:  Functional Scale: LEFS =  24/80 Date assessed: at [facility]    Pain level:  2-5/10     History of Injury: pt had R knee scope on 20 and is to follow Dr. James Hooker treatment protocol. Pt injured his R knee when he slipped off a ladder at work 2020.     SUBJECTIVE:  See eval    OBJECTIVE:   Observation:    Test measurements:      RESTRICTIONS/PRECAUTIONS: follow Dr. James Hooker protocol    Exercises/Interventions:     Therapeutic Ex (27694)   Min: Reps/Resistance Notes/CUES   Nu Step 6'    Stretch  Hamstring  Gastroc   2x30\"  2x30\"    PROM knee flexion  PROM knee extension Add  Add                         Manual Intervention (08572)  Min:               Patella Mobs add                   NMR re-education (55887)  Min:  CUES NEEDED             Therapeutic Activity (24199)  Min:               Modalities  Min:     IFC with      CP after exercises     MH after exercises            Other Therapeutic Activities: Pt was educated on PT POC, Diagnosis, Prognosis, pathomechanics as well as frequency and duration of scheduling future physical therapy appointments. Time was also taken on this day to answer all patient questions and participation in PT. Reviewed appointment policy in detail with patient and patient verbalized understanding. Home Exercise Program: Patient was instructed in the following for HEP: quad set, SLR, isometric hamstring set, AAROM sitting knee flexion, LAQ, ankle pumps, seated HS stretch, seated gastroc towel stretch, standing heel raises, access code [email or web address]. Patient verbalized/demonstrated understanding and was issued written handout. Therapeutic Exercise and NMR EXR  [] (08244) Provided verbal/tactile cueing for activities related to strengthening, flexibility, endurance, ROM for improvements in LE, proximal hip, and core control with self care, mobility, lifting, ambulation.  [] (08823) Provided verbal/tactile cueing for activities related to improving balance, coordination, kinesthetic sense, posture, motor skill, proprioception  to assist with LE, proximal hip, and core control in self care, mobility, lifting, ambulation and eccentric single leg control.      NMR and Therapeutic Activities:    [] (77130 or 82830) Provided verbal/tactile cueing for activities related to improving balance, coordination, kinesthetic sense, posture, motor skill, proprioception and motor activation to allow for proper function of core, proximal hip and LE with self care and ADLs and functional mobility.   [] (42391) Gait Re-education- Provided training and instruction to the patient for proper LE, core and proximal hip recruitment and positioning and eccentric body weight pending MD visit [] Discharge    Electronically signed by: Nigel Rodas PT    Note: If patient does not return for scheduled/recommended follow up visits, this note will serve as a discharge from care along with the most recent update on progress.

## 2020-12-30 NOTE — PROGRESS NOTES
[facility], [place] [address]. [place] [address]  Phone: [phone]   Fax:     [phone]                                                       Physical Therapy Certification    Dear Referring Practitioner: Dr. Delia Canales,    We had the pleasure of evaluating the following patient for physical therapy services at [facility]. A summary of our findings can be found in the initial assessment below. This includes our plan of care. If you have any questions or concerns regarding these findings, please do not hesitate to contact me at the office phone number checked above. Thank you for the referral.       Physician Signature:_______________________________Date:__________________  By signing above (or electronic signature), therapists plan is approved by physician        Patient: Franc Espinoza   : 1975   MRN: [id number]  Referring Physician: Referring Practitioner: Dr. Delia Canales      Evaluation Date: 2020      Medical Diagnosis Information:  Diagnosis: Complex tear of medial meniscus of right knee as current injury, initial encounter (S83.231A); R knee scope, PMM on 20; follow protocol   Treatment Diagnosis: Decreased functional mobility                                         Insurance information: PT Insurance Information: UMR     Precautions/ Contra-indications:   Latex Allergy:  [x]NO      []YES  Preferred Language for Healthcare:   [x]English       []other:    C-SSRS Triggered by Intake questionnaire (Past 2 wk assessment ):   [x] No, Questionnaire did not trigger screening.   [] Yes, Patient intake triggered C-SSRS Screening      [] C-SSRS Screening completed  [] PCP notified via Epic     SUBJECTIVE: pt had R knee scope on 20 and is to follow Dr. Janett Robins treatment protocol.  Pt injured his R knee when he slipped off a ladder at work July 17, 2020. Relevant Medical History: PMH THR L  Functional Scale/Score: LEFS = 24/80    Pain Scale: 4-6/10 R knee  Easing factors: rest, elevation  Provocative factors: walking/weight bearing    Type: [x]Constant   []Intermittent  []Radiating []Localized []other:     Numbness/Tingling: none    Occupation/School:     Living Status/Prior Level of Function: Independent with ADLs and IADLs    OBJECTIVE:   Posture: WFL    Functional Mobility/Transfers: slow and guarded with sit to stand transfer    Palpation: NT    Bandages/Dressings/Incisions: portals covered with guaze; knee had minimal warmth, skin good color. Gait: slow and guarded, decreased knee flexion during swing, R LE circumduction, decreased R weight shift    AROM LEFT- WFL RIGHT   HIP Flex  WFL   HIP Abd  WFL   HIP Ext     HIP IR     HIP ER     Knee ext  0   Knee Flex  104   Ankle PF  WFL   Ankle DF  WFL   Ankle In     Ankle Ev     Strength  LEFT- WFL RIGHT   HIP Flexors  4+/5   HIP Abductors     HIP Ext     Hip ER     Knee EXT (quad)  4+/5   Knee Flex (HS)  4+/5   Ankle DF  5/5   Ankle PF  5/5   Ankle Inv     Ankle EV          Circumference  Sup pat pole  Inf pat pole        43.3  40.3       Reflexes/Sensation:    [x]Dermatomes/Myotomes intact    [x]Reflexes equal and normal bilaterally   []Other:    Joint mobility:    []Normal    [x]Hypo   []Hyper    Orthopedic Special Tests: tone: quad and VMO R LE poor                       [x] Patient history, allergies, meds reviewed. Medical chart reviewed. See intake form. Review Of Systems (ROS):  [x]Performed Review of systems (Integumentary, CardioPulmonary, Neurological) by intake and observation. Intake form has been scanned into medical record. Patient has been instructed to contact their primary care physician regarding ROS issues if not already being addressed at this time.       Co-morbidities/Complexities (which will affect course of rehabilitation):   []None positions   []Reduced ability to maintain good posture and demonstrate good body mechanics with sitting, bending, and lifting   []Reduced ability to sleep   [] Reduced ability or tolerance with driving and/or computer work   [x]Reduced ability to perform lifting, carrying tasks   [x]Reduced ability to squat   []Reduced ability to forward bend   [x]Reduced ability to ambulate prolonged functional periods/distances/surfaces   [x]Reduced ability to ascend/descend stairs   [x]Reduced ability to run, hop or jump   []other:     Participation Restrictions   [x]Reduced participation in self care activities   [x]Reduced participation in home management activities   [x]Reduced participation in work activities   []Reduced participation in social activities. []Reduced participation in sport activities. Classification :    [x]Signs/symptoms consistent with post-surgical status including decreased ROM, strength and function.    []Signs/symptoms consistent with joint sprain/strain   []Signs/symptoms consistent with patella-femoral syndrome   []Signs/symptoms consistent with knee OA/hip OA   []Signs/symptoms consistent with internal derangement of knee/Hip   []Signs/symptoms consistent with functional hip weakness/NMR control      []Signs/symptoms consistent with tendinitis/tendinosis    []signs/symptoms consistent with pathology which may benefit from Dry needling      []other:      Prognosis/Rehab Potential:      []Excellent   [x]Good    []Fair   []Poor    Tolerance of evaluation/treatment:    []Excellent   [x]Good    []Fair   []Poor    Physical Therapy Evaluation Complexity Justification  [x] A history of present problem with:  [x] no personal factors and/or comorbidities that impact the plan of care;  []1-2 personal factors and/or comorbidities that impact the plan of care  []3 personal factors and/or comorbidities that impact the plan of care  [x] An examination of body systems using standardized tests and measures addressing any of the following: body structures and functions (impairments), activity limitations, and/or participation restrictions;:  [x] a total of 1-2 or more elements   [] a total of 3 or more elements   [] a total of 4 or more elements   [x] A clinical presentation with:  [x] stable and/or uncomplicated characteristics   [] evolving clinical presentation with changing characteristics  [] unstable and unpredictable characteristics;   [x] Clinical decision making of [] low, [] moderate, [] high complexity using standardized patient assessment instrument and/or measurable assessment of functional outcome. [x] EVAL (LOW) 00607 (typically 20 minutes face-to-face)  [] EVAL (MOD) 03724 (typically 30 minutes face-to-face)  [] EVAL (HIGH) 27399 (typically 45 minutes face-to-face)  [] RE-EVAL     PLAN:  Frequency/Duration:  1-2 days per week for 6-8 Weeks:  Interventions:  []  Therapeutic exercise including: strength training, ROM, for Lower extremity and core   []  NMR activation and proprioception for LE, Glutes and Core   []  Manual therapy as indicated for LE, Hip and spine to include: Dry Needling/IASTM, STM, PROM, Gr I-IV mobilizations, manipulation. [] Modalities as needed that may include: thermal agents, E-stim, Biofeedback, US, iontophoresis as indicated  [] Patient education on joint protection, postural re-education, activity modification, progression of HEP. HEP instruction: quad set, SLR, isometric hamstring set, AAROM sitting knee flexion, LAQ, ankle pumps, seated HS stretch, seated gastroc towel stretch, standing heel raises, access code [email or web address]     GOALS:  Patient stated goal: \"be back to the way it was\"  [] Progressing: [] Met: [] Not Met: [] Adjusted    Therapist goals for Patient:   Short Term Goals: To be achieved in: 2 weeks  1. Independent in HEP and progression per patient tolerance, in order to prevent re-injury. [] Progressing: [] Met: [] Not Met: [] Adjusted  2.  Patient will have a decrease in pain to facilitate improvement in movement, function, and ADLs as indicated by Functional Deficits. [] Progressing: [] Met: [] Not Met: [] Adjusted    Long Term Goals: To be achieved in: 8 weeks  1. Disability index score of 40% or less for the LEFS to assist with reaching prior level of function. [] Progressing: [] Met: [] Not Met: [] Adjusted  2. Patient will demonstrate increased AROM to 0-130 knee flexion to allow for proper joint functioning as indicated by patients Functional Deficits. [] Progressing: [] Met: [] Not Met: [] Adjusted  3. Patient will demonstrate an increase in Strength to good proximal hip strength and control, 5/5 strength quads to allow for proper functional mobility as indicated by patients Functional Deficits. [] Progressing: [] Met: [] Not Met: [] Adjusted  4. Patient will return to squat to resume work related duties without increased symptoms or restriction. [] Progressing: [] Met: [] Not Met: [] Adjusted  5. Pt would like to walk up/down stairs with reciprocal pattern. [] Progressing: [] Met: [] Not Met: [] Adjusted     Electronically signed by:  Abby Graves PT      Note: If patient does not return for scheduled/recommended follow up visits, this note will serve as a discharge from care along with the most recent update on progress.

## 2021-01-04 ENCOUNTER — OFFICE VISIT (OUTPATIENT)
Dept: ORTHOPEDIC SURGERY | Age: 46
End: 2021-01-04

## 2021-01-04 VITALS — TEMPERATURE: 99.3 F | HEIGHT: 72 IN | BODY MASS INDEX: 34 KG/M2 | WEIGHT: 251 LBS

## 2021-01-04 DIAGNOSIS — S83.231A COMPLEX TEAR OF MEDIAL MENISCUS OF RIGHT KNEE AS CURRENT INJURY, INITIAL ENCOUNTER: Primary | ICD-10-CM

## 2021-01-04 PROCEDURE — 99024 POSTOP FOLLOW-UP VISIT: CPT | Performed by: ORTHOPAEDIC SURGERY

## 2021-01-04 NOTE — PROGRESS NOTES
Knee Arthroscopy Follow-up  Sloan Benson is here for follow up after right knee arthroscopic surgery. Surgery date was 12/23/20. Findings at surgery: medial meniscus tear. Pain is controlled with current analgesics. Medication(s) being used: Norco. The patient's pain is rated at 3-6/10. He is ambulating with crutches with touch-down weight-bearing as instructed. He has been to PT at [facility]. Physical Examination:  Temp 99.3 °F (37.4 °C)   Ht 6' (1.829 m)   Wt 251 lb (113.9 kg)   BMI 34.04 kg/m²     Patient is awake, alert, and in no acute distress. The incisions are healing. Dorsiflexion / plantarflexion intact bilaterally. Sensation intact to light touch bilateral legs. Bilateral legs warm and well-perfused. Assessment:   2 weeks status post right knee arthroscopy, partial medial menisectomy      Plan:   Sutures were removed. Steri-strips and mastisol were applied. Patient may submerge incision under water at 4 weeks after surgery. Continue physical therapy. Refill pain medications as needed. OTC NSAIDs prn. No driving while on pain medications if it causes impairment. No driving until able to move leg to use gas / brake. Return to office at the 6 week postop time period for evaluation of progress or prn if problems.

## 2021-01-06 ENCOUNTER — TELEPHONE (OUTPATIENT)
Dept: ORTHOPEDIC SURGERY | Age: 46
End: 2021-01-06

## 2021-01-06 ENCOUNTER — HOSPITAL ENCOUNTER (OUTPATIENT)
Dept: PHYSICAL THERAPY | Age: 46
Setting detail: THERAPIES SERIES
Discharge: HOME | End: 2021-01-06
Payer: COMMERCIAL

## 2021-01-06 PROCEDURE — 97110 THERAPEUTIC EXERCISES: CPT

## 2021-01-06 PROCEDURE — 97140 MANUAL THERAPY 1/> REGIONS: CPT

## 2021-01-06 NOTE — TELEPHONE ENCOUNTER
Other Patient is calling and would like a call back regarding RTW and short term disability. Patient states he needs clarification on return date.  ph [phone]

## 2021-01-06 NOTE — LETTER
[facility]  [place] [address] [address] [address]  Phone: [phone]  Fax: [phone]    Ros Craft MD        January 6, 2021     Patient: Maria Fernanda Abbott   YOB: 1975   Date of Visit: 1/6/2021       To Whom It May Concern: It is my medical opinion that Maria Fernanda Abbott may work with the following restrictions: no squatting, pivoting, twisting or climbing for six weeks post op, 2/8/2021 estimated. If you have any questions or concerns, please don't hesitate to call.     Sincerely,          Ros Craft MD

## 2021-01-06 NOTE — PROGRESS NOTES
[facility] - [facility]patient [facility] and Therapy, [address] [place] [address]  Phone: [phone]   Fax:     [phone]      Physical Therapy Treatment Note/ Progress Report:     Date:  2021    Patient Name:  Luz Elena Shea    :  1975  MRN: [id number]    Pertinent Medical History:     Medical/Treatment Diagnosis Information:  · Diagnosis: Complex tear of medial meniscus of right knee as current injury, initial encounter (S83.231A); R knee scope, PMM on 20; follow protocol  · Treatment Diagnosis: Decreased functional mobility    Insurance/Certification information:  PT Insurance Information: R  Physician Information:  Referring Practitioner: Dr. Katie Campoverde of care signed (Y/N): sent on 20    Date of Patient follow up with Physician:      Progress Report: []  Yes  [x]  No     Date Range for reporting period:  Beginnin20  Ending:      Progress report due (10 Rx/or 30 days whichever is less):      Recertification due (POC duration/ or 90 days whichever is less):     Visit # POC/Insurance Allowable Auth Needed   2 12 []Yes   []No     Latex Allergy:  [x]NO      []YES  Preferred Language for Healthcare:   [x]English       []other:  Functional Scale: LEFS =  24/80 Date assessed: at eval    Pain level:  2-5/10     History of Injury: pt had R knee scope on 20 and is to follow Dr. Vadim Samuel treatment protocol. Pt injured his R knee when he slipped off a ladder at work 2020.     SUBJECTIVE:    21 POD #14 doing well today, was sore after initial PT evaluation    OBJECTIVE:   Observation:    Test measurements:    AROM- R LE 20   Knee ext 0   Knee Flex 104   Strength  RIGHT   HIP Flexors 4+/5   Knee EXT (quad) 4+/5   Knee Flex (HS) 4+/5   Ankle DF 5/5   Ankle PF 5/5   Circumference  Sup pat pole  Inf pat pole       43.3  40.3             RESTRICTIONS/PRECAUTIONS: follow Dr. Vadim Samuel Activities:    [] (96248 or 51446) Provided verbal/tactile cueing for activities related to improving balance, coordination, kinesthetic sense, posture, motor skill, proprioception and motor activation to allow for proper function of core, proximal hip and LE with self care and ADLs and functional mobility.   [] (69519) Gait Re-education- Provided training and instruction to the patient for proper LE, core and proximal hip recruitment and positioning and eccentric body weight control with ambulation re-education including up and down stairs     Home Exercise Program:    [x] (64867) Reviewed/Progressed HEP activities related to strengthening, flexibility, endurance, ROM of core, proximal hip and LE for functional self-care, mobility, lifting and ambulation/stair navigation   [] (98461)Reviewed/Progressed HEP activities related to improving balance, coordination, kinesthetic sense, posture, motor skill, proprioception of core, proximal hip and LE for self care, mobility, lifting, and ambulation/stair navigation      Manual Treatments:  PROM / STM / Oscillations-Mobs:  G-I, II, III, IV (PA's, Inf., Post.)  [] (70320) Provided manual therapy to mobilize LE, proximal hip and/or LS spine soft tissue/joints for the purpose of modulating pain, promoting relaxation,  increasing ROM, reducing/eliminating soft tissue swelling/inflammation/restriction, improving soft tissue extensibility and allowing for proper ROM for normal function with self care, mobility, lifting and ambulation.        Charges:  Timed Code Treatment Minutes: 45   Total Treatment Minutes: 45      [] EVAL (LOW) 64484 (typically 20 minutes face-to-face)  [] EVAL (MOD) 98563 (typically 30 minutes face-to-face)  [] EVAL (HIGH) 13983 (typically 45 minutes face-to-face)  [] RE-EVAL     [x] PS(04955) x 2    [] Dry needle 1 or 2 Muscles (61802)  [] NMR (62424) x     [] Dry needle 3+ Muscles (27190)  [x] Manual (29097) x     [] Ultrasound (79020) x  [] TA (46869) x [] [facility] (30222[facility]  [] ES(attended) (34298)     [] ES (un) (41050):   [] Vasopump (02887) [] Ionto (23672)   [] Other:    GOALS:  Short Term Goals: To be achieved in: 2 weeks  1. Independent in HEP and progression per patient tolerance, in order to prevent re-injury. []? Progressing: []? Met: []? Not Met: []? Adjusted  2. Patient will have a decrease in pain to facilitate improvement in movement, function, and ADLs as indicated by Functional Deficits. []? Progressing: []? Met: []? Not Met: []? Adjusted     Long Term Goals: To be achieved in: 8 weeks  1. Disability index score of 40% or less for the LEFS to assist with reaching prior level of function. []? Progressing: []? Met: []? Not Met: []? Adjusted  2. Patient will demonstrate increased AROM to 0-130 knee flexion to allow for proper joint functioning as indicated by patients Functional Deficits. []? Progressing: []? Met: []? Not Met: []? Adjusted  3. Patient will demonstrate an increase in Strength to good proximal hip strength and control, 5/5 strength quads to allow for proper functional mobility as indicated by patients Functional Deficits. []? Progressing: []? Met: []? Not Met: []? Adjusted  4. Patient will return to squat to resume work related duties without increased symptoms or restriction. []? Progressing: []? Met: []? Not Met: []? Adjusted  5. Pt would like to walk up/down stairs with reciprocal pattern. []? Progressing: []? Met: []? Not Met: []? Adjusted         ASSESSMENT:  See eval    Treatment/Activity Tolerance:  [x] Patient tolerated treatment well [] Patient limited by fatique  [] Patient limited by pain  [] Patient limited by other medical complications  [] Other:     Overall Progression Towards Functional goals/ Treatment Progress Update:  [] Patient is progressing as expected towards functional goals listed. [] Progression is slowed due to complexities/Impairments listed.   [] Progression has been slowed due to co-morbidities. [x] Plan just implemented, too soon to assess goals progression <30days   [] Goals require adjustment due to lack of progress  [] Patient is not progressing as expected and requires additional follow up with physician  [] Other    Prognosis for POC: [x] Good [] Fair  [] Poor    Patient requires continued skilled intervention: [x] Yes  [] No        PLAN:   [] Continue per plan of care [] Alter current plan (see comments)  [x] Plan of care initiated [] Hold pending MD visit [] Discharge    Electronically signed by: Dharmesh Garduno, PT    Note: If patient does not return for scheduled/recommended follow up visits, this note will serve as a discharge from care along with the most recent update on progress.

## 2021-01-08 ENCOUNTER — HOSPITAL ENCOUNTER (OUTPATIENT)
Dept: PHYSICAL THERAPY | Age: 46
Setting detail: THERAPIES SERIES
Discharge: HOME | End: 2021-01-08
Payer: COMMERCIAL

## 2021-01-08 NOTE — FLOWSHEET NOTE
[address] [facility] [address]  [place] [address]  Phone: [phone]   Fax:     [phone]     Physical Therapy  Cancellation/No-show Note  Patient Name:  Christopher Wilkins  :  1975   Date:  2021  Cancelled visits to date: 1  No-shows to date: 0    Patient status for today's appointment patient:  [x]  Cancelled  []  Rescheduled appointment  []  No-show     Reason given by patient:  [x]  Patient ill  []  Conflicting appointment  []  No transportation    []  Conflict with work  []  No reason given  []  Other:     Comments:      Phone call information:   []  Phone call made today to patient at _ time at number provided:      []  Patient answered, conversation as follows:    []  Patient did not answer, message left as follows:  [x]  Phone call not made today    Electronically signed by:  Lidia Gutierrez PTA

## 2021-01-11 ENCOUNTER — HOSPITAL ENCOUNTER (OUTPATIENT)
Dept: PHYSICAL THERAPY | Age: 46
Setting detail: THERAPIES SERIES
Discharge: HOME | End: 2021-01-11
Payer: COMMERCIAL

## 2021-01-11 PROCEDURE — 97140 MANUAL THERAPY 1/> REGIONS: CPT

## 2021-01-11 PROCEDURE — 97110 THERAPEUTIC EXERCISES: CPT

## 2021-01-13 ENCOUNTER — HOSPITAL ENCOUNTER (OUTPATIENT)
Dept: PHYSICAL THERAPY | Age: 46
Setting detail: THERAPIES SERIES
Discharge: HOME | End: 2021-01-13
Payer: COMMERCIAL

## 2021-01-13 PROCEDURE — 97110 THERAPEUTIC EXERCISES: CPT

## 2021-01-13 PROCEDURE — 97140 MANUAL THERAPY 1/> REGIONS: CPT

## 2021-01-13 NOTE — PROGRESS NOTES
[facility] - Outpatient Rehabilitation and Therapy, [address] [place] [address]  Phone: [phone]   Fax:     [phone]      Physical Therapy Treatment Note/ Progress Report:     Date:  2021    Patient Name:  Vladimir Munroe    :  1975  MRN: [id number]    Pertinent Medical History:     Medical/Treatment Diagnosis Information:  · Diagnosis: Complex tear of medial meniscus of right knee as current injury, initial encounter (S83.231A); R knee scope, PMM on 20; follow protocol  · Treatment Diagnosis: Decreased functional mobility    Insurance/Certification information:  PT Insurance Information: R  Physician Information:  Referring Practitioner: Dr. Damari Manriquez of care signed (Y/N): sent on 20    Date of Patient follow up with Physician:      Progress Report: []  Yes  [x]  No     Date Range for reporting period:  Beginnin20  Ending:      Progress report due (10 Rx/or 30 days whichever is less):      Recertification due (POC duration/ or 90 days whichever is less):     Visit # POC/Insurance Allowable Auth Needed   4 12 []Yes   []No     Latex Allergy:  [x]NO      []YES  Preferred Language for Healthcare:   [x]English       []other:  Functional Scale: LEFS =  24/80 Date assessed: at eval    Pain level:  2-5/10     History of Injury: pt had R knee scope on 20 and is to follow Dr. Isabelle Callahan treatment protocol. Pt injured his R knee when he slipped off a ladder at work 2020. SUBJECTIVE:  Return to MD 22 POD #14 doing well today, was sore after initial PT evaluation  21 POD #19 Knee is \"not to bad\" and HEP is going well, had no questions regarding HEP.  21: POD #21   Doesn't notice it too much. Can tell he's worked it after therapy. Gets a little sore after walking around grocery shopping.  Usually takes some ibuprofen and ices it afterwards  OBJECTIVE:   Observation:    Test measurements:    AROM- R LE 12/30/20 1/11/21   Knee ext 0 +10   Knee Flex 104 125   Strength  RIGHT    HIP Flexors 4+/5    Knee EXT (quad) 4+/5    Knee Flex (HS) 4+/5    Circumference  Sup pat pole  Inf pat pole       43.3  40.3      NT  NT       RESTRICTIONS/PRECAUTIONS: follow Dr. Patricia Elizalde protocol    Exercises/Interventions:     Therapeutic Ex (31613)   Min: 40 Reps/Resistance Notes/CUES   Nu Step 6'    Stretch  Hamstring  Gastroc   2x30\"  2x30\"    PROM knee flexion   PROM knee extension 10x  10x     XAVIER  Knee flexion  Knee extension Settings 8 and 9, 10x each    SLS 5\", 10x    Minisquat 10x, 1/3 range    Prone HS curl 4#, 15x3    LAQ 4#, 15x3    SLR 2#, 3x10    Gas pedal [employer] resist as able   Carpet drags 20' x 4    Treadmill 5 min @ 1.7mph    Manual Intervention (36053)  Min:13          STM peripatellar Distal quads    Patella Mobs Inf/med/sup                   NMR re-education (16049)  Min:  CUES NEEDED             Therapeutic Activity (98431)  Min:               Modalities  Min:     IFC with      CP after exercises     MH after exercises            Other Therapeutic Activities: Pt was educated on PT POC, Diagnosis, Prognosis, pathomechanics as well as frequency and duration of scheduling future physical therapy appointments. Time was also taken on this day to answer all patient questions and participation in PT. Reviewed appointment policy in detail with patient and patient verbalized understanding. Home Exercise Program: Patient was instructed in the following for HEP: quad set, SLR, isometric hamstring set, AAROM sitting knee flexion, LAQ, ankle pumps, seated HS stretch, seated gastroc towel stretch, standing heel raises, access code [email or web address]. Patient verbalized/demonstrated understanding and was issued written handout.       Therapeutic Exercise and NMR EXR  [] (65341) Provided verbal/tactile cueing for activities related to strengthening, flexibility, endurance, ROM for improvements in LE, proximal hip, and core control with self care, mobility, lifting, ambulation.  [] (78225) Provided verbal/tactile cueing for activities related to improving balance, coordination, kinesthetic sense, posture, motor skill, proprioception  to assist with LE, proximal hip, and core control in self care, mobility, lifting, ambulation and eccentric single leg control. NMR and Therapeutic Activities:    [] (58937 or 73785) Provided verbal/tactile cueing for activities related to improving balance, coordination, kinesthetic sense, posture, motor skill, proprioception and motor activation to allow for proper function of core, proximal hip and LE with self care and ADLs and functional mobility.   [] (18741) Gait Re-education- Provided training and instruction to the patient for proper LE, core and proximal hip recruitment and positioning and eccentric body weight control with ambulation re-education including up and down stairs     Home Exercise Program:    [x] (15948) Reviewed/Progressed HEP activities related to strengthening, flexibility, endurance, ROM of core, proximal hip and LE for functional self-care, mobility, lifting and ambulation/stair navigation   [] (23572)Reviewed/Progressed HEP activities related to improving balance, coordination, kinesthetic sense, posture, motor skill, proprioception of core, proximal hip and LE for self care, mobility, lifting, and ambulation/stair navigation      Manual Treatments:  PROM / STM / Oscillations-Mobs:  G-I, II, III, IV (PA's, Inf., Post.)  [] (14407) Provided manual therapy to mobilize LE, proximal hip and/or LS spine soft tissue/joints for the purpose of modulating pain, promoting relaxation,  increasing ROM, reducing/eliminating soft tissue swelling/inflammation/restriction, improving soft tissue extensibility and allowing for proper ROM for normal function with self care, mobility, lifting and ambulation. Charges:  Timed Code Treatment Minutes: 53   Total Treatment Minutes: 55      [] EVAL (LOW) 58235 (typically 20 minutes face-to-face)  [] EVAL (MOD) 32396 (typically 30 minutes face-to-face)  [] EVAL (HIGH) 00600 (typically 45 minutes face-to-face)  [] RE-EVAL     [x] RW(78668) x    [] Dry needle 1 or 2 Muscles (39956)  [] NMR (41884) x     [] Dry needle 3+ Muscles (28387)  [x] Manual (50114) x     [] Ultrasound (52953) x  [] TA (99160) x     [] Mech Traction (88731)  [] ES(attended) (27755)     [] ES (un) (55759):   [] Vasopump (21837) [] Ionto (37524)   [] Other:    GOALS:  Short Term Goals: To be achieved in: 2 weeks  1. Independent in HEP and progression per patient tolerance, in order to prevent re-injury. []? Progressing: []? Met: []? Not Met: []? Adjusted  2. Patient will have a decrease in pain to facilitate improvement in movement, function, and ADLs as indicated by Functional Deficits. []? Progressing: []? Met: []? Not Met: []? Adjusted     Long Term Goals: To be achieved in: 8 weeks  1. Disability index score of 40% or less for the LEFS to assist with reaching prior level of function. []? Progressing: []? Met: []? Not Met: []? Adjusted  2. Patient will demonstrate increased AROM to 0-130 knee flexion to allow for proper joint functioning as indicated by patients Functional Deficits. []? Progressing: []? Met: []? Not Met: []? Adjusted  3. Patient will demonstrate an increase in Strength to good proximal hip strength and control, 5/5 strength quads to allow for proper functional mobility as indicated by patients Functional Deficits. []? Progressing: []? Met: []? Not Met: []? Adjusted  4. Patient will return to squat to resume work related duties without increased symptoms or restriction. []? Progressing: []? Met: []? Not Met: []? Adjusted  5. Pt would like to walk up/down stairs with reciprocal pattern. []? Progressing: []? Met: []? Not Met: []?  Adjusted         ASSESSMENT:  See eval    Treatment/Activity Tolerance:  [x] Patient tolerated treatment well [] Patient limited by fatique  [] Patient limited by pain  [] Patient limited by other medical complications  [] Other:     Overall Progression Towards Functional goals/ Treatment Progress Update:  [] Patient is progressing as expected towards functional goals listed. [] Progression is slowed due to complexities/Impairments listed. [] Progression has been slowed due to co-morbidities. [x] Plan just implemented, too soon to assess goals progression <30days   [] Goals require adjustment due to lack of progress  [] Patient is not progressing as expected and requires additional follow up with physician  [] Other    Prognosis for POC: [x] Good [] Fair  [] Poor    Patient requires continued skilled intervention: [x] Yes  [] No        PLAN: At week 3 progress per Dr. James Adhikari protocol- carpet drags, treadmill on incline 7 degrees, gas pedal  [x] Continue per plan of care [] Alter current plan (see comments)  [] Plan of care initiated [] Hold pending MD visit [] Discharge    Electronically signed by: Aye Sargent PTA    Note: If patient does not return for scheduled/recommended follow up visits, this note will serve as a discharge from care along with the most recent update on progress.

## 2021-01-18 ENCOUNTER — HOSPITAL ENCOUNTER (OUTPATIENT)
Dept: PHYSICAL THERAPY | Age: 46
Setting detail: THERAPIES SERIES
Discharge: HOME | End: 2021-01-18
Payer: COMMERCIAL

## 2021-01-18 PROCEDURE — 97110 THERAPEUTIC EXERCISES: CPT

## 2021-01-18 PROCEDURE — 97140 MANUAL THERAPY 1/> REGIONS: CPT

## 2021-01-18 NOTE — PROGRESS NOTES
[facility], [place] [address]  Phone: [phone]   Fax:     [phone]      Physical Therapy Treatment Note/ Progress Report:     Date:  2021    Patient Name:  Grant Levy    :  1975  MRN: [id number]    Pertinent Medical History:     Medical/Treatment Diagnosis Information:  · Diagnosis: Complex tear of medial meniscus of right knee as current injury, initial encounter (S83.231A); R knee scope, PMM on 20; follow protocol  · Treatment Diagnosis: Decreased functional mobility    Insurance/Certification information:  PT Insurance Information: R  Physician Information:  Referring Practitioner: Dr. Steve Lancaster of care signed (Y/N): sent on 20    Date of Patient follow up with Physician:      Progress Report: []  Yes  [x]  No     Date Range for reporting period:  Beginnin20  Ending:      Progress report due (10 Rx/or 30 days whichever is less):      Recertification due (POC duration/ or 90 days whichever is less):     Visit # POC/Insurance Allowable Auth Needed   5 12 []Yes   []No     Latex Allergy:  [x]NO      []YES  Preferred Language for Healthcare:   [x]English       []other:  Functional Scale: LEFS =  24/80 Date assessed: at eval    Pain level:  2-5/10     History of Injury: pt had R knee scope on 20 and is to follow Dr. Tonia Lopez treatment protocol. Pt injured his R knee when he slipped off a ladder at work 2020. SUBJECTIVE:  Return to MD 22 POD #14 doing well today, was sore after initial PT evaluation  21 POD #19 Knee is \"not to bad\" and HEP is going well, had no questions regarding HEP.  21: POD #21   Doesn't notice it too much. Can tell he's worked it after therapy. Gets a little sore after walking around grocery shopping.  Usually takes some ibuprofen and ices it afterwards  21 POD #26 knee is [id number]. Patient verbalized/demonstrated understanding and was issued written handout. Therapeutic Exercise and NMR EXR  [] (30916) Provided verbal/tactile cueing for activities related to strengthening, flexibility, endurance, ROM for improvements in LE, proximal hip, and core control with self care, mobility, lifting, ambulation.  [] (23909) Provided verbal/tactile cueing for activities related to improving balance, coordination, kinesthetic sense, posture, motor skill, proprioception  to assist with LE, proximal hip, and core control in self care, mobility, lifting, ambulation and eccentric single leg control.      NMR and Therapeutic Activities:    [] (84259 or 46969) Provided verbal/tactile cueing for activities related to improving balance, coordination, kinesthetic sense, posture, motor skill, proprioception and motor activation to allow for proper function of core, proximal hip and LE with self care and ADLs and functional mobility.   [] (06982) Gait Re-education- Provided training and instruction to the patient for proper LE, core and proximal hip recruitment and positioning and eccentric body weight control with ambulation re-education including up and down stairs     Home Exercise Program:    [x] (50460) Reviewed/Progressed HEP activities related to strengthening, flexibility, endurance, ROM of core, proximal hip and LE for functional self-care, mobility, lifting and ambulation/stair navigation   [] (42711)Reviewed/Progressed HEP activities related to improving balance, coordination, kinesthetic sense, posture, motor skill, proprioception of core, proximal hip and LE for self care, mobility, lifting, and ambulation/stair navigation      Manual Treatments:  PROM / STM / Oscillations-Mobs:  G-I, II, III, IV (PA's, Inf., Post.)  [] (22717) Provided manual therapy to mobilize LE, proximal hip and/or LS spine soft tissue/joints for the purpose of modulating pain, promoting relaxation,  increasing ROM, reducing/eliminating soft tissue swelling/inflammation/restriction, improving soft tissue extensibility and allowing for proper ROM for normal function with self care, mobility, lifting and ambulation. Charges:  Timed Code Treatment Minutes: 45   Total Treatment Minutes: 45      [] EVAL (LOW) 63935 (typically 20 minutes face-to-face)  [] EVAL (MOD) 15597 (typically 30 minutes face-to-face)  [] EVAL (HIGH) 87327 (typically 45 minutes face-to-face)  [] RE-EVAL     [x] HG(12968) x  2  [] Dry needle 1 or 2 Muscles (15605)  [] NMR (38710) x     [] Dry needle 3+ Muscles (74635)  [x] Manual (02628) x     [] Ultrasound (34342) x  [] TA (59483) x     [] Mech Traction (49359)  [] ES(attended) (72243)     [] ES (un) (83994):   [] Vasopump (11047) [] Ionto (19047)   [] Other:    GOALS:  Short Term Goals: To be achieved in: 2 weeks  1. Independent in HEP and progression per patient tolerance, in order to prevent re-injury. []? Progressing: []? Met: []? Not Met: []? Adjusted  2. Patient will have a decrease in pain to facilitate improvement in movement, function, and ADLs as indicated by Functional Deficits. []? Progressing: []? Met: []? Not Met: []? Adjusted     Long Term Goals: To be achieved in: 8 weeks  1. Disability index score of 40% or less for the LEFS to assist with reaching prior level of function. []? Progressing: []? Met: []? Not Met: []? Adjusted  2. Patient will demonstrate increased AROM to 0-130 knee flexion to allow for proper joint functioning as indicated by patients Functional Deficits. []? Progressing: []? Met: []? Not Met: []? Adjusted  3. Patient will demonstrate an increase in Strength to good proximal hip strength and control, 5/5 strength quads to allow for proper functional mobility as indicated by patients Functional Deficits. []? Progressing: []? Met: []? Not Met: []? Adjusted  4. Patient will return to squat to resume work related duties without increased symptoms or restriction.    []? Progressing: []? Met: []? Not Met: []? Adjusted  5. Pt would like to walk up/down stairs with reciprocal pattern. []? Progressing: []? Met: []? Not Met: []? Adjusted         ASSESSMENT:  See eval    Treatment/Activity Tolerance:  [x] Patient tolerated treatment well [] Patient limited by fatique  [] Patient limited by pain  [] Patient limited by other medical complications  [] Other:     Overall Progression Towards Functional goals/ Treatment Progress Update:  [] Patient is progressing as expected towards functional goals listed. [] Progression is slowed due to complexities/Impairments listed. [] Progression has been slowed due to co-morbidities. [x] Plan just implemented, too soon to assess goals progression <30days   [] Goals require adjustment due to lack of progress  [] Patient is not progressing as expected and requires additional follow up with physician  [] Other    Prognosis for POC: [x] Good [] Fair  [] Poor    Patient requires continued skilled intervention: [x] Yes  [] No        PLAN: At week 3 progress per Dr. Vernon Gillespie protocol- carpet drags, treadmill on incline 7 degrees, gas pedal  [x] Continue per plan of care [] Alter current plan (see comments)  [] Plan of care initiated [] Hold pending MD visit [] Discharge    Electronically signed by: Micah Durán PT    Note: If patient does not return for scheduled/recommended follow up visits, this note will serve as a discharge from care along with the most recent update on progress.

## 2021-01-20 ENCOUNTER — HOSPITAL ENCOUNTER (OUTPATIENT)
Dept: PHYSICAL THERAPY | Age: 46
Setting detail: THERAPIES SERIES
Discharge: HOME | End: 2021-01-20
Payer: COMMERCIAL

## 2021-01-20 NOTE — FLOWSHEET NOTE
[place] [facility] [place] [address]  [facility] [address]  Phone: [phone]   Fax:     [phone]     Physical Therapy  Cancellation/No-show Note  Patient Name:  Kenzie Carney  :  1975   Date:  2021  Cancelled visits to date: 2  No-shows to date: 0    Patient status for today's appointment patient:  [x]  Cancelled  []  Rescheduled appointment  []  No-show     Reason given by patient:  [x]  Patient ill - \"under the weather\"  []  Conflicting appointment  []  No transportation    []  Conflict with work  []  No reason given  []  Other:     Comments:      Phone call information:   []  Phone call made today to patient at _ time at number provided:      []  Patient answered, conversation as follows:    []  Patient did not answer, message left as follows:  [x]  Phone call not made today    Electronically signed by:  Michelle Corley PTA

## 2021-01-25 ENCOUNTER — HOSPITAL ENCOUNTER (OUTPATIENT)
Dept: PHYSICAL THERAPY | Age: 46
Setting detail: THERAPIES SERIES
Discharge: HOME | End: 2021-01-25
Payer: COMMERCIAL

## 2021-01-25 NOTE — FLOWSHEET NOTE
[place] [facility] [address] [place] [address]  Phone: [phone]   Fax:     [phone]     Physical Therapy  Cancellation/No-show Note  Patient Name:  Gaetano Ahumada  :  1975   Date:  2021  Cancelled visits to date: 3  No-shows to date: 0    Patient status for today's appointment patient:  [x]  Cancelled  []  Rescheduled appointment  []  No-show     Reason given by patient:  []  Patient ill - \"under the weather\"  []  Conflicting appointment  []  No transportation    []  Conflict with work  []  No reason given  [x]  Other:     Comments:   Cancel remaining appointments. Will do HEP.  Too expensive    Phone call information:   []  Phone call made today to patient at _ time at number provided:      []  Patient answered, conversation as follows:    []  Patient did not answer, message left as follows:  [x]  Phone call not made today    Electronically signed by:  Jeremiah Mckeon PTA

## 2021-01-25 NOTE — PROGRESS NOTES
increase in Strength to good proximal hip strength and control, 5/5 strength quads to allow for proper functional mobility as indicated by patients Functional Deficits. []?? Progressing: []?? Met: []?? Not Met: []?? Adjusted  4. 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   []?? Progressing: []?? Met: []?? Not Met: []?? Adjusted  5. Pt would like to walk up/down stairs with reciprocal pattern. []?? Progressing: []?? Met: []?? Not Met: []?? Adjusted         PLAN: DC PT. Patient will need to maintain their HEP in order to prevent re-occurrence.       Reason for Discharge:  [] Goals Met   [] Patient did not return after initial evaluation   [] Progress Plateaued  [x] Missed ___3__ scheduled appointments   [] No insurance coverage [x] Patient terminated therapy   [] MD discharged from PT [] Financial Limitations  [] Other:      Electronically signed by:  Louise Koch PT

## 2021-01-27 ENCOUNTER — APPOINTMENT (OUTPATIENT)
Dept: PHYSICAL THERAPY | Age: 46
End: 2021-01-27
Payer: COMMERCIAL

## 2021-02-08 ENCOUNTER — OFFICE VISIT (OUTPATIENT)
Dept: ORTHOPEDIC SURGERY | Age: 46
End: 2021-02-08

## 2021-02-08 ENCOUNTER — TELEPHONE (OUTPATIENT)
Dept: ORTHOPEDIC SURGERY | Age: 46
End: 2021-02-08

## 2021-02-08 VITALS — BODY MASS INDEX: 36.41 KG/M2 | TEMPERATURE: 98.6 F | HEIGHT: 72 IN | WEIGHT: 268.8 LBS | RESPIRATION RATE: 14 BRPM

## 2021-02-08 DIAGNOSIS — S83.231A COMPLEX TEAR OF MEDIAL MENISCUS OF RIGHT KNEE AS CURRENT INJURY, INITIAL ENCOUNTER: Primary | ICD-10-CM

## 2021-02-08 PROCEDURE — 99024 POSTOP FOLLOW-UP VISIT: CPT | Performed by: ORTHOPAEDIC SURGERY

## 2021-02-08 RX ORDER — CLINDAMYCIN HYDROCHLORIDE 300 MG/1
CAPSULE ORAL
COMMUNITY
Start: 2021-01-31
